# Patient Record
Sex: MALE | Race: WHITE | NOT HISPANIC OR LATINO | URBAN - METROPOLITAN AREA
[De-identification: names, ages, dates, MRNs, and addresses within clinical notes are randomized per-mention and may not be internally consistent; named-entity substitution may affect disease eponyms.]

---

## 2023-11-28 ENCOUNTER — TELEPHONE (OUTPATIENT)
Dept: PRIMARY CARE | Facility: CLINIC | Age: 43
End: 2023-11-28
Payer: COMMERCIAL

## 2023-11-28 DIAGNOSIS — Z00.00 ROUTINE GENERAL MEDICAL EXAMINATION AT A HEALTH CARE FACILITY: Primary | ICD-10-CM

## 2023-12-21 ENCOUNTER — LAB (OUTPATIENT)
Dept: LAB | Facility: LAB | Age: 43
End: 2023-12-21
Payer: COMMERCIAL

## 2023-12-21 DIAGNOSIS — Z00.00 ROUTINE GENERAL MEDICAL EXAMINATION AT A HEALTH CARE FACILITY: ICD-10-CM

## 2023-12-21 LAB
25(OH)D3 SERPL-MCNC: 51 NG/ML (ref 30–100)
ALBUMIN SERPL BCP-MCNC: 4.7 G/DL (ref 3.4–5)
ALP SERPL-CCNC: 52 U/L (ref 33–120)
ALT SERPL W P-5'-P-CCNC: 20 U/L (ref 10–52)
ANION GAP SERPL CALC-SCNC: 11 MMOL/L (ref 10–20)
AST SERPL W P-5'-P-CCNC: 17 U/L (ref 9–39)
BASOPHILS # BLD AUTO: 0.03 X10*3/UL (ref 0–0.1)
BASOPHILS NFR BLD AUTO: 0.6 %
BILIRUB SERPL-MCNC: 1 MG/DL (ref 0–1.2)
BUN SERPL-MCNC: 20 MG/DL (ref 6–23)
CALCIUM SERPL-MCNC: 9.7 MG/DL (ref 8.6–10.6)
CHLORIDE SERPL-SCNC: 102 MMOL/L (ref 98–107)
CHOLEST SERPL-MCNC: 173 MG/DL (ref 0–199)
CHOLESTEROL/HDL RATIO: 3
CO2 SERPL-SCNC: 33 MMOL/L (ref 21–32)
CREAT SERPL-MCNC: 1.13 MG/DL (ref 0.5–1.3)
CRP SERPL HS-MCNC: 0.4 MG/L
EOSINOPHIL # BLD AUTO: 0.1 X10*3/UL (ref 0–0.7)
EOSINOPHIL NFR BLD AUTO: 1.9 %
ERYTHROCYTE [DISTWIDTH] IN BLOOD BY AUTOMATED COUNT: 12.6 % (ref 11.5–14.5)
GFR SERPL CREATININE-BSD FRML MDRD: 82 ML/MIN/1.73M*2
GLUCOSE SERPL-MCNC: 87 MG/DL (ref 74–99)
HCT VFR BLD AUTO: 43.8 % (ref 41–52)
HDLC SERPL-MCNC: 57.9 MG/DL
HGB BLD-MCNC: 14.2 G/DL (ref 13.5–17.5)
IMM GRANULOCYTES # BLD AUTO: 0.03 X10*3/UL (ref 0–0.7)
IMM GRANULOCYTES NFR BLD AUTO: 0.6 % (ref 0–0.9)
LDLC SERPL CALC-MCNC: 102 MG/DL
LYMPHOCYTES # BLD AUTO: 2.31 X10*3/UL (ref 1.2–4.8)
LYMPHOCYTES NFR BLD AUTO: 44.2 %
MCH RBC QN AUTO: 30 PG (ref 26–34)
MCHC RBC AUTO-ENTMCNC: 32.4 G/DL (ref 32–36)
MCV RBC AUTO: 92 FL (ref 80–100)
MONOCYTES # BLD AUTO: 0.41 X10*3/UL (ref 0.1–1)
MONOCYTES NFR BLD AUTO: 7.8 %
NEUTROPHILS # BLD AUTO: 2.35 X10*3/UL (ref 1.2–7.7)
NEUTROPHILS NFR BLD AUTO: 44.9 %
NON HDL CHOLESTEROL: 115 MG/DL (ref 0–149)
NRBC BLD-RTO: 0 /100 WBCS (ref 0–0)
PLATELET # BLD AUTO: 232 X10*3/UL (ref 150–450)
POTASSIUM SERPL-SCNC: 4.3 MMOL/L (ref 3.5–5.3)
PROT SERPL-MCNC: 7.2 G/DL (ref 6.4–8.2)
PSA SERPL-MCNC: 0.29 NG/ML
RBC # BLD AUTO: 4.74 X10*6/UL (ref 4.5–5.9)
SODIUM SERPL-SCNC: 142 MMOL/L (ref 136–145)
TRIGL SERPL-MCNC: 68 MG/DL (ref 0–149)
TSH SERPL-ACNC: 0.9 MIU/L (ref 0.44–3.98)
VIT B12 SERPL-MCNC: 448 PG/ML (ref 211–911)
VLDL: 14 MG/DL (ref 0–40)
WBC # BLD AUTO: 5.2 X10*3/UL (ref 4.4–11.3)

## 2023-12-21 PROCEDURE — 84443 ASSAY THYROID STIM HORMONE: CPT

## 2023-12-21 PROCEDURE — 82607 VITAMIN B-12: CPT

## 2023-12-21 PROCEDURE — 86141 C-REACTIVE PROTEIN HS: CPT

## 2023-12-21 PROCEDURE — 84153 ASSAY OF PSA TOTAL: CPT

## 2023-12-21 PROCEDURE — 85025 COMPLETE CBC W/AUTO DIFF WBC: CPT

## 2023-12-21 PROCEDURE — 80053 COMPREHEN METABOLIC PANEL: CPT

## 2023-12-21 PROCEDURE — 36415 COLL VENOUS BLD VENIPUNCTURE: CPT

## 2023-12-21 PROCEDURE — 80061 LIPID PANEL: CPT

## 2023-12-21 PROCEDURE — 82306 VITAMIN D 25 HYDROXY: CPT

## 2023-12-23 PROBLEM — R06.83 SNORING: Status: ACTIVE | Noted: 2023-12-23

## 2023-12-23 PROBLEM — M77.01 MEDIAL EPICONDYLITIS OF RIGHT ELBOW: Status: ACTIVE | Noted: 2023-12-23

## 2023-12-23 PROBLEM — F41.9 ANXIETY: Status: ACTIVE | Noted: 2023-12-23

## 2023-12-23 PROBLEM — M77.02 MEDIAL EPICONDYLITIS OF LEFT ELBOW: Status: ACTIVE | Noted: 2023-12-23

## 2023-12-23 RX ORDER — CHOLECALCIFEROL (VITAMIN D3) 125 MCG
125 CAPSULE ORAL EVERY OTHER DAY
COMMUNITY
Start: 2013-08-27

## 2023-12-23 RX ORDER — CETIRIZINE HYDROCHLORIDE 10 MG/1
1 TABLET ORAL DAILY
COMMUNITY
Start: 2015-09-08

## 2023-12-23 NOTE — PROGRESS NOTES
Subjective   Patient ID: Gibson Thornton is a 43 y.o. male who presents for CPE    HPI   The patient reports a history of constant sharp pain, redness, swelling over the metatarsophalangeal joint left first toe beginning October 7.  He reports an increase in the intensity the pain when pressure was applied to the joint or when placing weight on the foot.  He reports no other associated symptoms.  He reports that he experienced the aforementioned symptoms up until Halloween despite the fact that he initially took Motrin 600 mg every 6 hours and then was placed on a course of colchicine and a Medrol Dosepak by his podiatrist.  Since hollowing, he has experienced sporadic episodes of cramping pain of the left first toe.  He reports no precipitating, exacerbating, relieving factors.  No other associated symptoms.    He reports a history of intermittent episodes of dull pain over the medial surface of the right elbow beginning in February of this year.  He reports that the episodes of pain have been precipitated by the patient pulling or by the patient grabbing something heavy.  He reports no radiation of discomfort and no other associated symptoms.  He reports that the aforementioned episodes developed after he had been throwing a ball frequently in February of this year.    Over the past year, he reports no episodes of gas pain in the periumbilical region.    He reports no other new complaints.  Note that the patient never began use of doxepin as I had prescribed at the time of his complete physical examination last year.    Labs obtained December 21 were reviewed.  Review of Systems  All systems have been reviewed and are normal except as previously noted  Objective   There were no vitals taken for this visit.    Physical Exam  Head - palpation revealed no tenderness over the maxillary or frontal sinuses.  Eyes - extraocular movements intact, pupils equal and reactive to light; fundi revealed good retinal color, no  hemorrhages or exudates.  Ears - palpation of pinnas and traguses revealed no tenderness. External auditory canals not erythematous or swollen. Right TM clear; left TM clear.  Nose - turbinates not erythematous or swollen. Nasal septal deviation noted to the left.  Mouth - posterior pharynx is not erythematous or swollen. Tonsillar pillars appeared normal no exudates.  Neck - no lymphadenopathy. Thyroid gland not enlarged, No bruits.  Lungs - clear to auscultation bilaterally.  Cardiac - rate normal, rhythm regular, no murmurs, no JVD.  Abdomen - soft nondistended, normal active bowel sounds. Palpation revealed no tenderness or masses.  Pulses - 2+ bilaterally.  Extremities - no peripheral edema.  Musculoskeletal  Right elbow-no erythema or swelling.  Full range of motion with tightness noted over the medial epicondyle in all directions of motion.  Palpation did reveal mild tenderness over the medial epicondyle but no increase in warmth  Metatarsophalangeal joint left first toe-no erythema or swelling, Full range of motion in all directions of motion with no pain. Palpation revealed no tenderness or increase in warmth    Neurologic  Mental status-alert and oriented x3   Cranial nerves-2 through 12 grossly intact, no visual field abnormalities  Motor-no pronator drift noted, strength-5/5 in all muscle groups tested, , no tremor noted.  No bradykinesia noted.  No rigidity noted.  Negative pull test  Sensory-Light touch sensation fully intact  Pinprick sensation fully intact  Vibratory sensation fully intact  Cerebellar-no truncal ataxia, good coordination finger-nose testing,, good coordination heel-to-shin testing, normal rapid alternating movements  Romberg negative, no abnormality in tandem gait  Reflexes-2+/4 bilaterally.    Assessment/Plan        Assessment  Allergic rhinitis  History of an episode of constant sharp pain, redness, swelling over the metatarsal phalangeal joint left first toe-may have been  secondary to acute gouty arthritis.  Intermittent episodes of discomfort noted over the metatarsal phalangeal joint left first toe-unsure of etiology.  May be secondary to osteoarthritis  Left shoulder tendinitis.  Intermittent episodes of dull pain noted over the medial surface of the right elbow-likely secondary to medial epicondylitis right elbow  Medial epicondylitis right elbow  Medial epicondylitis left elbow  Lateral epicondylitis left elbow  ? Sprain and right fifth finger.  Patellar tendinitis left knee.  Left ankle sprain October 2016.  History of episode of constant sharp pain, redness, swelling metatarsophalangeal joint left first toe-probably represented an episode of acute gouty arthritis  Sporadic episodes of crampy pain in the left first toe-may be secondary to osteoarthritis  Benign orchiodynia  Corneal abrasion L eye  Wart located right postauricular region.-remote  Chronic forgetfulness, chronic decreased concentration may be secondary to anxiety, ADHD-unlikely,  Chronic intermittent insomnia manifested as difficulty staying and falling back asleep-probably secondary to primary insomnia     Plan  Obtain EKG, urinalysis, today.  I will add a uric acid level to blood testing that the patient had performed December 21.  I told the patient that he could begin use of Motrin 600-800 mg p.o. every 6 hours as needed pain and that he could apply Voltaren gel to the right elbow every 6 hours as needed.  I have prescribed doxepin to be used at a dose of 10 mg p.o. nightly as needed sleep  The patient will continue all of his other vitamins and medications.  He will call me in 1 month with his condition regarding the chronic insomnia

## 2023-12-26 ENCOUNTER — OFFICE VISIT (OUTPATIENT)
Dept: PRIMARY CARE | Facility: CLINIC | Age: 43
End: 2023-12-26
Payer: COMMERCIAL

## 2023-12-26 VITALS
DIASTOLIC BLOOD PRESSURE: 70 MMHG | BODY MASS INDEX: 23.35 KG/M2 | HEART RATE: 84 BPM | SYSTOLIC BLOOD PRESSURE: 100 MMHG | WEIGHT: 160.4 LBS

## 2023-12-26 DIAGNOSIS — M77.01 MEDIAL EPICONDYLITIS OF RIGHT ELBOW: ICD-10-CM

## 2023-12-26 DIAGNOSIS — Z12.11 COLON CANCER SCREENING: ICD-10-CM

## 2023-12-26 DIAGNOSIS — F51.01 PRIMARY INSOMNIA: ICD-10-CM

## 2023-12-26 DIAGNOSIS — M10.072 ACUTE IDIOPATHIC GOUT INVOLVING TOE OF LEFT FOOT: Primary | ICD-10-CM

## 2023-12-26 DIAGNOSIS — Z00.00 ROUTINE GENERAL MEDICAL EXAMINATION AT A HEALTH CARE FACILITY: ICD-10-CM

## 2023-12-26 PROBLEM — R79.89 LOW VITAMIN D LEVEL: Status: ACTIVE | Noted: 2023-12-26

## 2023-12-26 LAB
POC APPEARANCE, URINE: CLEAR
POC BILIRUBIN, URINE: NEGATIVE
POC BLOOD, URINE: ABNORMAL
POC COLOR, URINE: YELLOW
POC GLUCOSE, URINE: NEGATIVE MG/DL
POC KETONES, URINE: NEGATIVE MG/DL
POC LEUKOCYTES, URINE: NEGATIVE
POC NITRITE,URINE: NEGATIVE
POC PH, URINE: 7 PH
POC PROTEIN, URINE: NEGATIVE MG/DL
POC SPECIFIC GRAVITY, URINE: 1.02
POC UROBILINOGEN, URINE: 0.2 EU/DL

## 2023-12-26 PROCEDURE — 99396 PREV VISIT EST AGE 40-64: CPT | Performed by: INTERNAL MEDICINE

## 2023-12-26 PROCEDURE — 93000 ELECTROCARDIOGRAM COMPLETE: CPT | Performed by: INTERNAL MEDICINE

## 2023-12-26 PROCEDURE — 81002 URINALYSIS NONAUTO W/O SCOPE: CPT | Performed by: INTERNAL MEDICINE

## 2023-12-26 PROCEDURE — 99213 OFFICE O/P EST LOW 20 MIN: CPT | Performed by: INTERNAL MEDICINE

## 2023-12-26 RX ORDER — DOXEPIN HYDROCHLORIDE 10 MG/1
10 CAPSULE ORAL NIGHTLY
Qty: 30 CAPSULE | Refills: 1 | Status: SHIPPED | OUTPATIENT
Start: 2023-12-26

## 2023-12-26 RX ORDER — FLUTICASONE PROPIONATE 50 MCG
2 SPRAY, SUSPENSION (ML) NASAL DAILY
COMMUNITY
Start: 2014-06-02

## 2023-12-26 RX ORDER — DOXEPIN HYDROCHLORIDE 10 MG/1
10 CAPSULE ORAL NIGHTLY
COMMUNITY
End: 2023-12-26 | Stop reason: SDUPTHER

## 2024-02-23 DIAGNOSIS — Z12.11 COLON CANCER SCREENING: Primary | ICD-10-CM

## 2024-03-11 DIAGNOSIS — Z12.11 COLON CANCER SCREENING: Primary | ICD-10-CM

## 2024-04-08 DIAGNOSIS — Z12.11 COLON CANCER SCREENING: ICD-10-CM

## 2024-04-08 RX ORDER — POLYETHYLENE GLYCOL 3350, SODIUM SULFATE ANHYDROUS, SODIUM BICARBONATE, SODIUM CHLORIDE, POTASSIUM CHLORIDE 236; 22.74; 6.74; 5.86; 2.97 G/4L; G/4L; G/4L; G/4L; G/4L
4000 POWDER, FOR SOLUTION ORAL ONCE
Qty: 4000 ML | Refills: 0 | Status: SHIPPED | OUTPATIENT
Start: 2024-04-08 | End: 2024-04-08

## 2024-05-13 ENCOUNTER — APPOINTMENT (OUTPATIENT)
Dept: GASTROENTEROLOGY | Facility: EXTERNAL LOCATION | Age: 44
End: 2024-05-13
Payer: COMMERCIAL

## 2024-05-21 ENCOUNTER — OFFICE VISIT (OUTPATIENT)
Dept: GASTROENTEROLOGY | Facility: EXTERNAL LOCATION | Age: 44
End: 2024-05-21
Payer: COMMERCIAL

## 2024-05-21 DIAGNOSIS — K57.30 DIVERTICULOSIS OF LARGE INTESTINE WITHOUT DIVERTICULITIS: Primary | ICD-10-CM

## 2024-05-21 DIAGNOSIS — Z12.11 COLON CANCER SCREENING: ICD-10-CM

## 2024-05-21 PROCEDURE — G0121 COLON CA SCRN NOT HI RSK IND: HCPCS | Performed by: INTERNAL MEDICINE

## 2024-05-21 NOTE — PROGRESS NOTES
Colonoscopy performed today 5/21/2024 at the [facility] ([facility] [place]  See procedure report(s) under Media tab.

## 2024-10-03 DIAGNOSIS — R79.89 LOW VITAMIN D LEVEL: ICD-10-CM

## 2024-10-03 DIAGNOSIS — Z00.00 ROUTINE GENERAL MEDICAL EXAMINATION AT A HEALTH CARE FACILITY: Primary | ICD-10-CM

## 2024-11-27 ENCOUNTER — TELEPHONE (OUTPATIENT)
Dept: PRIMARY CARE | Facility: CLINIC | Age: 44
End: 2024-11-27

## 2024-11-27 ENCOUNTER — LAB (OUTPATIENT)
Dept: LAB | Facility: LAB | Age: 44
End: 2024-11-27
Payer: COMMERCIAL

## 2024-11-27 DIAGNOSIS — Z00.00 ROUTINE GENERAL MEDICAL EXAMINATION AT A HEALTH CARE FACILITY: ICD-10-CM

## 2024-11-27 DIAGNOSIS — M10.072 ACUTE IDIOPATHIC GOUT INVOLVING TOE OF LEFT FOOT: ICD-10-CM

## 2024-11-27 DIAGNOSIS — R79.89 LOW VITAMIN D LEVEL: ICD-10-CM

## 2024-11-27 LAB
25(OH)D3 SERPL-MCNC: 67 NG/ML (ref 30–100)
ALBUMIN SERPL BCP-MCNC: 4.7 G/DL (ref 3.4–5)
ALP SERPL-CCNC: 45 U/L (ref 33–120)
ALT SERPL W P-5'-P-CCNC: 17 U/L (ref 10–52)
ANION GAP SERPL CALC-SCNC: 13 MMOL/L (ref 10–20)
AST SERPL W P-5'-P-CCNC: 20 U/L (ref 9–39)
BASOPHILS # BLD AUTO: 0.02 X10*3/UL (ref 0–0.1)
BASOPHILS NFR BLD AUTO: 0.5 %
BILIRUB SERPL-MCNC: 0.8 MG/DL (ref 0–1.2)
BUN SERPL-MCNC: 17 MG/DL (ref 6–23)
CALCIUM SERPL-MCNC: 9.5 MG/DL (ref 8.6–10.6)
CHLORIDE SERPL-SCNC: 104 MMOL/L (ref 98–107)
CHOLEST SERPL-MCNC: 149 MG/DL (ref 0–199)
CHOLESTEROL/HDL RATIO: 2.9
CO2 SERPL-SCNC: 31 MMOL/L (ref 21–32)
CREAT SERPL-MCNC: 1.2 MG/DL (ref 0.5–1.3)
CRP SERPL HS-MCNC: 0.8 MG/L
EGFRCR SERPLBLD CKD-EPI 2021: 76 ML/MIN/1.73M*2
EOSINOPHIL # BLD AUTO: 0.07 X10*3/UL (ref 0–0.7)
EOSINOPHIL NFR BLD AUTO: 1.6 %
ERYTHROCYTE [DISTWIDTH] IN BLOOD BY AUTOMATED COUNT: 12.6 % (ref 11.5–14.5)
GLUCOSE SERPL-MCNC: 74 MG/DL (ref 74–99)
HCT VFR BLD AUTO: 42.5 % (ref 41–52)
HDLC SERPL-MCNC: 51.6 MG/DL
HGB BLD-MCNC: 13.9 G/DL (ref 13.5–17.5)
HIV 1+2 AB+HIV1 P24 AG SERPL QL IA: NONREACTIVE
IMM GRANULOCYTES # BLD AUTO: 0.02 X10*3/UL (ref 0–0.7)
IMM GRANULOCYTES NFR BLD AUTO: 0.5 % (ref 0–0.9)
LDLC SERPL CALC-MCNC: 82 MG/DL
LYMPHOCYTES # BLD AUTO: 1.91 X10*3/UL (ref 1.2–4.8)
LYMPHOCYTES NFR BLD AUTO: 44 %
MCH RBC QN AUTO: 30.5 PG (ref 26–34)
MCHC RBC AUTO-ENTMCNC: 32.7 G/DL (ref 32–36)
MCV RBC AUTO: 93 FL (ref 80–100)
MONOCYTES # BLD AUTO: 0.35 X10*3/UL (ref 0.1–1)
MONOCYTES NFR BLD AUTO: 8.1 %
NEUTROPHILS # BLD AUTO: 1.97 X10*3/UL (ref 1.2–7.7)
NEUTROPHILS NFR BLD AUTO: 45.3 %
NON HDL CHOLESTEROL: 97 MG/DL (ref 0–149)
NRBC BLD-RTO: 0 /100 WBCS (ref 0–0)
PLATELET # BLD AUTO: 262 X10*3/UL (ref 150–450)
POTASSIUM SERPL-SCNC: 4.3 MMOL/L (ref 3.5–5.3)
PROT SERPL-MCNC: 7.1 G/DL (ref 6.4–8.2)
PSA SERPL-MCNC: 0.3 NG/ML
RBC # BLD AUTO: 4.55 X10*6/UL (ref 4.5–5.9)
SODIUM SERPL-SCNC: 144 MMOL/L (ref 136–145)
TRIGL SERPL-MCNC: 78 MG/DL (ref 0–149)
TSH SERPL-ACNC: 0.91 MIU/L (ref 0.44–3.98)
URATE SERPL-MCNC: 8.1 MG/DL (ref 4–7.5)
VIT B12 SERPL-MCNC: 453 PG/ML (ref 211–911)
VLDL: 16 MG/DL (ref 0–40)
WBC # BLD AUTO: 4.3 X10*3/UL (ref 4.4–11.3)

## 2024-11-27 PROCEDURE — 84153 ASSAY OF PSA TOTAL: CPT

## 2024-11-27 PROCEDURE — 86141 C-REACTIVE PROTEIN HS: CPT

## 2024-11-27 PROCEDURE — 82607 VITAMIN B-12: CPT

## 2024-11-27 PROCEDURE — 82306 VITAMIN D 25 HYDROXY: CPT

## 2024-11-27 PROCEDURE — 85025 COMPLETE CBC W/AUTO DIFF WBC: CPT

## 2024-11-27 PROCEDURE — 80061 LIPID PANEL: CPT

## 2024-11-27 PROCEDURE — 84550 ASSAY OF BLOOD/URIC ACID: CPT

## 2024-11-27 PROCEDURE — 84443 ASSAY THYROID STIM HORMONE: CPT

## 2024-11-27 PROCEDURE — 36415 COLL VENOUS BLD VENIPUNCTURE: CPT

## 2024-11-27 PROCEDURE — 87389 HIV-1 AG W/HIV-1&-2 AB AG IA: CPT

## 2024-11-27 PROCEDURE — 80053 COMPREHEN METABOLIC PANEL: CPT

## 2024-12-04 NOTE — PROGRESS NOTES
Subjective   Patient ID: Gibson Thornton is a 44 y.o. male who presents for CPE    HPI   The patient reports that from February 2024 to the end of August 2024, he experienced intermittent episodes of dull pain over the lateral surface of the left elbow.  He reports that the episodes were precipitated by grabbing objects.  He reports no radiation of discomfort and no other associated symptoms.  He did begin a physical therapy program which ended at the end of the summer and since the end of August he has experienced no episodes of pain.  Over the past year, he reports no episodes of dull pain over the medial surface of the right elbow.    Over the past year, the patient reports continued chronic sporadic episodes of crampy pain in the left first toe.  He reports no precipitating, exacerbating, relieving factors.  He reports no other associated symptoms.  Over the past year, he reports no flares of acute gouty arthritis in the left first toe.    No other new complaints.  Review of Systems  All systems have been reviewed and are normal except as previously noted.  Labs obtained November 27 were reviewed  Objective   There were no vitals taken for this visit.    Physical Exam  Head - palpation revealed no tenderness over the maxillary or frontal sinuses.  Eyes - extraocular movements intact, pupils equal and reactive to light; fundi revealed good retinal color, no hemorrhages or exudates.  Ears - palpation of pinnas and traguses revealed no tenderness. External auditory canals not erythematous or swollen. Right TM clear; left TM clear.  Nose - turbinates not erythematous or swollen. Nasal septal deviation noted to the left.  Mouth - posterior pharynx is not erythematous or swollen. Tonsillar pillars appeared normal no exudates.  Neck - no lymphadenopathy. Thyroid gland not enlarged, No bruits.  Lungs - clear to auscultation bilaterally.  Cardiac - rate normal, rhythm regular, no murmurs, no JVD.  Abdomen - soft  nondistended, normal active bowel sounds. Palpation revealed no tenderness or masses.  Pulses - 2+ bilaterally.  Extremities - no peripheral edema.  Musculoskeletal    Metatarsophalangeal joint left first toe-no erythema or swelling, Full range of motion in all directions of motion with no pain. Palpation revealed no tenderness or increase in warmth     Neurologic  Mental status-alert and oriented x3   Cranial nerves-2 through 12 grossly intact, no visual field abnormalities  Motor-no pronator drift noted, strength-5/5 in all muscle groups tested, , no tremor noted.  No bradykinesia noted.  No rigidity noted.  Negative pull test  Sensory-Light touch sensation fully intact  Pinprick sensation fully intact  Vibratory sensation fully intact  Cerebellar-no truncal ataxia, good coordination finger-nose testing,, good coordination heel-to-shin testing, normal rapid alternating movements  Romberg negative, no abnormality in tandem gait  Reflexes-2+/4 bilaterally.    Assessment/Plan         Assessment  Allergic rhinitis  Left shoulder tendinitis.  Medial epicondylitis right elbow  Medial epicondylitis left elbow  Lateral epicondylitis left elbow  ? Sprain and right fifth finger.  Patellar tendinitis left knee.  Left ankle sprain October 2016.  Chronic sporadic episodes of crampy pain in the left first toe-may be secondary to osteoarthritis  Benign orchiodynia  Corneal abrasion L eye - remote  Wart located right postauricular region.-remote  Chronic forgetfulness, chronic decreased concentration may be secondary to anxiety, ADHD-unlikely,  Chronic intermittent insomnia manifested as difficulty staying and falling back asleep-probably secondary to primary insomnia     Plan  Obtain EKG, urinalysis, today.  I suggested that the patient continue use of doxepin 10 mg p.o. nightly and that he use melatonin 3-5 mg p.o. in the early morning hours if he is unable to stay or fall back asleep.  He will continue all of his other current  medications and supplements and he will return for a complete physical examination in 1 year

## 2024-12-05 ENCOUNTER — APPOINTMENT (OUTPATIENT)
Dept: PRIMARY CARE | Facility: CLINIC | Age: 44
End: 2024-12-05
Payer: COMMERCIAL

## 2024-12-05 VITALS
HEART RATE: 84 BPM | TEMPERATURE: 97 F | SYSTOLIC BLOOD PRESSURE: 88 MMHG | HEIGHT: 70 IN | DIASTOLIC BLOOD PRESSURE: 62 MMHG | WEIGHT: 163 LBS | BODY MASS INDEX: 23.34 KG/M2

## 2024-12-05 DIAGNOSIS — M10.072 ACUTE IDIOPATHIC GOUT INVOLVING TOE OF LEFT FOOT: ICD-10-CM

## 2024-12-05 DIAGNOSIS — M77.01 MEDIAL EPICONDYLITIS OF RIGHT ELBOW: ICD-10-CM

## 2024-12-05 DIAGNOSIS — Z00.00 ROUTINE GENERAL MEDICAL EXAMINATION AT A HEALTH CARE FACILITY: Primary | ICD-10-CM

## 2024-12-05 LAB
POC APPEARANCE, URINE: CLEAR
POC BILIRUBIN, URINE: NEGATIVE
POC BLOOD, URINE: NEGATIVE
POC COLOR, URINE: YELLOW
POC GLUCOSE, URINE: NEGATIVE MG/DL
POC KETONES, URINE: NEGATIVE MG/DL
POC LEUKOCYTES, URINE: NEGATIVE
POC NITRITE,URINE: NEGATIVE
POC PH, URINE: 6 PH
POC PROTEIN, URINE: NEGATIVE MG/DL
POC SPECIFIC GRAVITY, URINE: 1.01
POC UROBILINOGEN, URINE: 0.2 EU/DL

## 2024-12-05 PROCEDURE — 93000 ELECTROCARDIOGRAM COMPLETE: CPT | Performed by: INTERNAL MEDICINE

## 2024-12-05 PROCEDURE — 3008F BODY MASS INDEX DOCD: CPT | Performed by: INTERNAL MEDICINE

## 2024-12-05 PROCEDURE — 81002 URINALYSIS NONAUTO W/O SCOPE: CPT | Performed by: INTERNAL MEDICINE

## 2024-12-05 PROCEDURE — 99396 PREV VISIT EST AGE 40-64: CPT | Performed by: INTERNAL MEDICINE

## 2025-02-22 NOTE — PROGRESS NOTES
Subjective   Patient ID: Gibson Thornton is a 44 y.o. male who presents for tingling sensation    HPI     Review of Systems    Objective   There were no vitals taken for this visit.    Physical Exam    Assessment/Plan   {[email or web address]/[email or web address]:88821}

## 2025-02-24 ENCOUNTER — APPOINTMENT (OUTPATIENT)
Dept: PRIMARY CARE | Facility: CLINIC | Age: 45
End: 2025-02-24
Payer: COMMERCIAL

## 2025-03-05 ENCOUNTER — OFFICE VISIT (OUTPATIENT)
Dept: PRIMARY CARE | Facility: CLINIC | Age: 45
End: 2025-03-05
Payer: COMMERCIAL

## 2025-03-05 VITALS
HEART RATE: 86 BPM | HEIGHT: 70 IN | WEIGHT: 157.4 LBS | BODY MASS INDEX: 22.54 KG/M2 | TEMPERATURE: 97.2 F | DIASTOLIC BLOOD PRESSURE: 86 MMHG | SYSTOLIC BLOOD PRESSURE: 94 MMHG

## 2025-03-05 DIAGNOSIS — N41.1 CHRONIC PROSTATITIS: Primary | ICD-10-CM

## 2025-03-05 PROBLEM — G47.00 PERSISTENT INSOMNIA: Status: ACTIVE | Noted: 2025-03-05

## 2025-03-05 PROCEDURE — 99213 OFFICE O/P EST LOW 20 MIN: CPT | Performed by: INTERNAL MEDICINE

## 2025-03-05 PROCEDURE — 3008F BODY MASS INDEX DOCD: CPT | Performed by: INTERNAL MEDICINE

## 2025-03-05 RX ORDER — SULFAMETHOXAZOLE AND TRIMETHOPRIM 800; 160 MG/1; MG/1
1 TABLET ORAL 2 TIMES DAILY
Qty: 28 TABLET | Refills: 0 | Status: SHIPPED | OUTPATIENT
Start: 2025-03-05 | End: 2025-03-15

## 2025-03-05 NOTE — PROGRESS NOTES
"Subjective   Patient ID: Gibson Thornton is a 44 y.o. male who presents for     HPI   The patient presented to the office today after experiencing an episode of intense pain during ejaculation the evening of March 1.  He reports that the pain developed in the rectal area and radiated to the penis.  He reports that since then he has experienced a constant sensation of something being in the rectum, constant rectal urgency.  He does report that the intensity of the sensation did increase after exercise yesterday.  He also reports a history of constant tingling in the penis and urinary urgency arch second.  He reports no associated fever, chills, dysuria, frequency, weak stream, hesitancy, interruption of stream, postvoid dribbling.  Review of Systems    Objective   Temp 36.2 °C (97.2 °F) (Temporal)   Ht 1.765 m (5' 9.5\")   Wt 71.4 kg (157 lb 6.4 oz)   BMI 22.91 kg/m²     Physical Exam  Abdomen-soft, nondistended.  Normal active bowel sounds.  Palpation revealed no tenderness or masses  Rectal-no stool in vault.  Hemorrhoids noted.  Prostate gland mildly enlarged and boggy, no masses  Assessment/Plan     Assessment  History of intense pain during ejaculation with associated history of constant tingling in the penis, urinary urgency, constant sensation of something being in the rectum, rectal urgency-May be secondary to chronic nonbacterial prostatitis/chronic pelvic pain syndrome, inflammatory versus noninflammatory, chronic prostatitis.  Plan  Obtain postmassage urinalysis today.  Begin Bactrim DS 1 tablet p.o. twice daily x 14 days.  Begin ibuprofen 400-600 mg p.o. every 6 hours as needed discomfort  The patient will call me in 2 weeks with his condition.     "

## 2025-03-06 LAB
APPEARANCE UR: CLEAR
BACTERIA #/AREA URNS HPF: NORMAL /HPF
BACTERIA UR CULT: NORMAL
BILIRUB UR QL STRIP: NEGATIVE
COLOR UR: YELLOW
GLUCOSE UR QL STRIP: NEGATIVE
HGB UR QL STRIP: NEGATIVE
HYALINE CASTS #/AREA URNS LPF: NORMAL /LPF
KETONES UR QL STRIP: NEGATIVE
LEUKOCYTE ESTERASE UR QL STRIP: NEGATIVE
NITRITE UR QL STRIP: NEGATIVE
PH UR STRIP: 6 [PH] (ref 5–8)
PROT UR QL STRIP: NEGATIVE
RBC #/AREA URNS HPF: NORMAL /HPF
SERVICE CMNT-IMP: NORMAL
SP GR UR STRIP: 1.01 (ref 1–1.03)
SQUAMOUS #/AREA URNS HPF: NORMAL /HPF
WBC #/AREA URNS HPF: NORMAL /HPF

## 2025-12-16 ENCOUNTER — APPOINTMENT (OUTPATIENT)
Dept: PRIMARY CARE | Facility: CLINIC | Age: 45
End: 2025-12-16
Payer: COMMERCIAL